# Patient Record
Sex: FEMALE | Race: WHITE | Employment: UNEMPLOYED | URBAN - NONMETROPOLITAN AREA
[De-identification: names, ages, dates, MRNs, and addresses within clinical notes are randomized per-mention and may not be internally consistent; named-entity substitution may affect disease eponyms.]

---

## 2017-01-25 ENCOUNTER — TELEPHONE (OUTPATIENT)
Dept: FAMILY MEDICINE CLINIC | Age: 82
End: 2017-01-25

## 2017-02-01 ENCOUNTER — OFFICE VISIT (OUTPATIENT)
Dept: FAMILY MEDICINE CLINIC | Age: 82
End: 2017-02-01

## 2017-02-01 VITALS
OXYGEN SATURATION: 97 % | BODY MASS INDEX: 44 KG/M2 | HEART RATE: 92 BPM | WEIGHT: 195.6 LBS | HEIGHT: 56 IN | SYSTOLIC BLOOD PRESSURE: 166 MMHG | DIASTOLIC BLOOD PRESSURE: 86 MMHG

## 2017-02-01 DIAGNOSIS — E78.49 OTHER HYPERLIPIDEMIA: ICD-10-CM

## 2017-02-01 DIAGNOSIS — F41.9 ANXIETY: ICD-10-CM

## 2017-02-01 DIAGNOSIS — E66.01 MORBID OBESITY WITH BMI OF 40.0-44.9, ADULT (HCC): ICD-10-CM

## 2017-02-01 DIAGNOSIS — H18.519 FUCHS' CORNEAL DYSTROPHY: ICD-10-CM

## 2017-02-01 DIAGNOSIS — I10 ESSENTIAL HYPERTENSION: Primary | ICD-10-CM

## 2017-02-01 DIAGNOSIS — R41.82 ALTERED MENTAL STATUS, UNSPECIFIED ALTERED MENTAL STATUS TYPE: ICD-10-CM

## 2017-02-01 LAB
BILIRUBIN, POC: NEGATIVE
BLOOD URINE, POC: NEGATIVE
CLARITY, POC: CLEAR
COLOR, POC: YELLOW
GLUCOSE URINE, POC: NEGATIVE
KETONES, POC: NEGATIVE
LEUKOCYTE EST, POC: NEGATIVE
NITRITE, POC: NEGATIVE
PH, POC: 7
PROTEIN, POC: NEGATIVE
SPECIFIC GRAVITY, POC: 1.02
UROBILINOGEN, POC: 0.2

## 2017-02-01 PROCEDURE — 4040F PNEUMOC VAC/ADMIN/RCVD: CPT | Performed by: FAMILY MEDICINE

## 2017-02-01 PROCEDURE — G8427 DOCREV CUR MEDS BY ELIG CLIN: HCPCS | Performed by: FAMILY MEDICINE

## 2017-02-01 PROCEDURE — G8419 CALC BMI OUT NRM PARAM NOF/U: HCPCS | Performed by: FAMILY MEDICINE

## 2017-02-01 PROCEDURE — 1036F TOBACCO NON-USER: CPT | Performed by: FAMILY MEDICINE

## 2017-02-01 PROCEDURE — 81002 URINALYSIS NONAUTO W/O SCOPE: CPT | Performed by: FAMILY MEDICINE

## 2017-02-01 PROCEDURE — 1090F PRES/ABSN URINE INCON ASSESS: CPT | Performed by: FAMILY MEDICINE

## 2017-02-01 PROCEDURE — G8400 PT W/DXA NO RESULTS DOC: HCPCS | Performed by: FAMILY MEDICINE

## 2017-02-01 PROCEDURE — G8484 FLU IMMUNIZE NO ADMIN: HCPCS | Performed by: FAMILY MEDICINE

## 2017-02-01 PROCEDURE — 99214 OFFICE O/P EST MOD 30 MIN: CPT | Performed by: FAMILY MEDICINE

## 2017-02-01 PROCEDURE — 1123F ACP DISCUSS/DSCN MKR DOCD: CPT | Performed by: FAMILY MEDICINE

## 2017-02-01 RX ORDER — FAMCICLOVIR 250 MG/1
250 TABLET, FILM COATED ORAL DAILY
COMMUNITY
End: 2018-08-30 | Stop reason: DRUGHIGH

## 2017-02-01 RX ORDER — ESCITALOPRAM OXALATE 5 MG/1
5 TABLET ORAL DAILY
Qty: 30 TABLET | Refills: 5 | Status: SHIPPED | OUTPATIENT
Start: 2017-02-01 | End: 2017-05-01 | Stop reason: SDUPTHER

## 2017-02-01 ASSESSMENT — ENCOUNTER SYMPTOMS
CONSTIPATION: 0
SHORTNESS OF BREATH: 0
DIARRHEA: 0
BLURRED VISION: 1

## 2017-05-01 DIAGNOSIS — I10 ESSENTIAL HYPERTENSION: ICD-10-CM

## 2017-05-01 DIAGNOSIS — F41.9 ANXIETY: ICD-10-CM

## 2017-05-01 RX ORDER — ATORVASTATIN CALCIUM 10 MG/1
TABLET, FILM COATED ORAL
Qty: 90 TABLET | Refills: 3 | Status: SHIPPED | OUTPATIENT
Start: 2017-05-01 | End: 2018-04-26 | Stop reason: SDUPTHER

## 2017-05-01 RX ORDER — ESCITALOPRAM OXALATE 5 MG/1
5 TABLET ORAL DAILY
Qty: 30 TABLET | Refills: 5 | Status: SHIPPED | OUTPATIENT
Start: 2017-05-01 | End: 2017-11-14 | Stop reason: SDUPTHER

## 2017-05-01 RX ORDER — AMLODIPINE BESYLATE 5 MG/1
5 TABLET ORAL DAILY
Qty: 90 TABLET | Refills: 3 | Status: SHIPPED | OUTPATIENT
Start: 2017-05-01 | End: 2018-04-26 | Stop reason: SDUPTHER

## 2017-05-01 RX ORDER — LOSARTAN POTASSIUM 100 MG/1
100 TABLET ORAL DAILY
Qty: 90 TABLET | Refills: 3 | Status: SHIPPED | OUTPATIENT
Start: 2017-05-01 | End: 2018-04-26 | Stop reason: SDUPTHER

## 2017-05-03 ENCOUNTER — TELEPHONE (OUTPATIENT)
Dept: FAMILY MEDICINE CLINIC | Age: 82
End: 2017-05-03

## 2017-05-04 ENCOUNTER — TELEPHONE (OUTPATIENT)
Dept: FAMILY MEDICINE CLINIC | Age: 82
End: 2017-05-04

## 2017-05-04 DIAGNOSIS — L98.9 FACE LESION: Primary | ICD-10-CM

## 2017-08-14 ENCOUNTER — TELEPHONE (OUTPATIENT)
Dept: FAMILY MEDICINE CLINIC | Age: 82
End: 2017-08-14

## 2017-11-14 DIAGNOSIS — F41.9 ANXIETY: ICD-10-CM

## 2017-11-14 RX ORDER — ESCITALOPRAM OXALATE 5 MG/1
5 TABLET ORAL DAILY
Qty: 30 TABLET | Refills: 5 | Status: SHIPPED | OUTPATIENT
Start: 2017-11-14 | End: 2018-03-30 | Stop reason: SDUPTHER

## 2017-12-12 RX ORDER — FAMCICLOVIR 250 MG/1
250 TABLET, FILM COATED ORAL 2 TIMES DAILY
Qty: 60 TABLET | Refills: 5 | Status: SHIPPED | OUTPATIENT
Start: 2017-12-12 | End: 2017-12-13 | Stop reason: SDUPTHER

## 2017-12-13 RX ORDER — FAMCICLOVIR 250 MG/1
250 TABLET, FILM COATED ORAL 2 TIMES DAILY
Qty: 60 TABLET | Refills: 5 | Status: SHIPPED | OUTPATIENT
Start: 2017-12-13 | End: 2018-01-12

## 2017-12-18 ENCOUNTER — TELEPHONE (OUTPATIENT)
Dept: FAMILY MEDICINE CLINIC | Age: 82
End: 2017-12-18

## 2018-03-30 DIAGNOSIS — F41.9 ANXIETY: ICD-10-CM

## 2018-04-02 RX ORDER — ESCITALOPRAM OXALATE 5 MG/1
TABLET ORAL
Qty: 30 TABLET | Refills: 5 | Status: SHIPPED | OUTPATIENT
Start: 2018-04-02 | End: 2018-10-15 | Stop reason: SDUPTHER

## 2018-05-31 ENCOUNTER — OFFICE VISIT (OUTPATIENT)
Dept: FAMILY MEDICINE CLINIC | Age: 83
End: 2018-05-31

## 2018-05-31 VITALS
WEIGHT: 180.6 LBS | BODY MASS INDEX: 40.63 KG/M2 | SYSTOLIC BLOOD PRESSURE: 138 MMHG | OXYGEN SATURATION: 96 % | DIASTOLIC BLOOD PRESSURE: 72 MMHG | HEIGHT: 56 IN | HEART RATE: 70 BPM

## 2018-05-31 DIAGNOSIS — E78.49 OTHER HYPERLIPIDEMIA: ICD-10-CM

## 2018-05-31 DIAGNOSIS — H18.519 FUCHS' CORNEAL DYSTROPHY: ICD-10-CM

## 2018-05-31 DIAGNOSIS — I10 ESSENTIAL HYPERTENSION: Primary | ICD-10-CM

## 2018-05-31 DIAGNOSIS — E66.01 MORBID OBESITY WITH BMI OF 40.0-44.9, ADULT (HCC): ICD-10-CM

## 2018-05-31 PROCEDURE — G8510 SCR DEP NEG, NO PLAN REQD: HCPCS | Performed by: FAMILY MEDICINE

## 2018-05-31 PROCEDURE — 1090F PRES/ABSN URINE INCON ASSESS: CPT | Performed by: FAMILY MEDICINE

## 2018-05-31 PROCEDURE — G8417 CALC BMI ABV UP PARAM F/U: HCPCS | Performed by: FAMILY MEDICINE

## 2018-05-31 PROCEDURE — G8427 DOCREV CUR MEDS BY ELIG CLIN: HCPCS | Performed by: FAMILY MEDICINE

## 2018-05-31 PROCEDURE — 3288F FALL RISK ASSESSMENT DOCD: CPT | Performed by: FAMILY MEDICINE

## 2018-05-31 PROCEDURE — 1123F ACP DISCUSS/DSCN MKR DOCD: CPT | Performed by: FAMILY MEDICINE

## 2018-05-31 PROCEDURE — G8400 PT W/DXA NO RESULTS DOC: HCPCS | Performed by: FAMILY MEDICINE

## 2018-05-31 PROCEDURE — 4040F PNEUMOC VAC/ADMIN/RCVD: CPT | Performed by: FAMILY MEDICINE

## 2018-05-31 PROCEDURE — 1036F TOBACCO NON-USER: CPT | Performed by: FAMILY MEDICINE

## 2018-05-31 PROCEDURE — 99214 OFFICE O/P EST MOD 30 MIN: CPT | Performed by: FAMILY MEDICINE

## 2018-05-31 ASSESSMENT — PATIENT HEALTH QUESTIONNAIRE - PHQ9
2. FEELING DOWN, DEPRESSED OR HOPELESS: 0
SUM OF ALL RESPONSES TO PHQ9 QUESTIONS 1 & 2: 0
1. LITTLE INTEREST OR PLEASURE IN DOING THINGS: 0
SUM OF ALL RESPONSES TO PHQ QUESTIONS 1-9: 0

## 2018-05-31 ASSESSMENT — ENCOUNTER SYMPTOMS
BLOOD IN STOOL: 0
CONSTIPATION: 0
CHEST TIGHTNESS: 0
DIARRHEA: 0
BLURRED VISION: 1
SHORTNESS OF BREATH: 0

## 2018-08-10 LAB
A/G RATIO: 1 (ref 1–2.5)
ALBUMIN SERPL-MCNC: 3.7 G/DL (ref 3.6–5)
ALP BLD-CCNC: 77 U/L (ref 46–116)
ALT SERPL-CCNC: 24 U/L (ref 12–78)
ANION GAP SERPL CALCULATED.3IONS-SCNC: 16.4 MMOL/L (ref 8–16)
AST SERPL-CCNC: 19 U/L (ref 12–36)
BILIRUB SERPL-MCNC: 0.5 MG/DL (ref 0–1.1)
BUN BLDV-MCNC: 14 MG/DL (ref 5–19)
CALCIUM SERPL-MCNC: 8.9 MG/DL (ref 8.4–10.2)
CHLORIDE BLD-SCNC: 105 MMOL/L (ref 99–111)
CHOLESTEROL, TOTAL: 152 MG/DL (ref 0–200)
CO2: 25 MMOL/L (ref 21–33)
CREAT SERPL-MCNC: 1.1 MG/DL (ref 0.6–1.3)
GFR CALCULATED: 47
GLOBULIN: 3.6 G/DL (ref 2–3.5)
GLUCOSE BLD-MCNC: 112 MG/DL (ref 74–99)
HDLC SERPL-MCNC: 56 MG/DL (ref 18–88)
LDL CHOLESTEROL DIRECT: 63.2 MG/DL
LDL/HDL RATIO: 1.1
POTASSIUM SERPL-SCNC: 4.2 MMOL/L (ref 3.4–5)
SODIUM BLD-SCNC: 142 MMOL/L (ref 136–146)
TOTAL PROTEIN: 7.3 G/DL (ref 6.3–8)
TRIGL SERPL-MCNC: 164 MG/DL (ref 33–163)
VLDLC SERPL CALC-MCNC: 32.8 MG/DL (ref 10–50)

## 2018-08-10 NOTE — LETTER
[address]  [address]  Phone: [phone]  Fax: [phone]    Elias Snider MD        August 10, 2018     Jennie Sees  [address]  [address] [address]      Dear Sanam Rao:    Below are the results from your recent visit:      Labs all okay. If you have any questions or concerns, please don't hesitate to call.     Sincerely,        Elias Snider MD

## 2018-08-30 ENCOUNTER — TELEPHONE (OUTPATIENT)
Dept: FAMILY MEDICINE CLINIC | Age: 83
End: 2018-08-30

## 2018-08-30 RX ORDER — FAMCICLOVIR 250 MG/1
250 TABLET, FILM COATED ORAL DAILY
Qty: 30 TABLET | Refills: 2 | OUTPATIENT
Start: 2018-08-30 | End: 2018-09-29

## 2018-10-02 ENCOUNTER — TELEPHONE (OUTPATIENT)
Dept: FAMILY MEDICINE CLINIC | Age: 83
End: 2018-10-02

## 2018-10-02 PROBLEM — R32 URINARY INCONTINENCE: Status: ACTIVE | Noted: 2018-10-02

## 2018-10-02 PROBLEM — R26.81 UNSTEADY: Status: ACTIVE | Noted: 2018-10-02

## 2018-11-07 ENCOUNTER — OFFICE VISIT (OUTPATIENT)
Dept: FAMILY MEDICINE CLINIC | Age: 83
End: 2018-11-07
Payer: MEDICARE

## 2018-11-07 VITALS
DIASTOLIC BLOOD PRESSURE: 68 MMHG | BODY MASS INDEX: 41.17 KG/M2 | WEIGHT: 183 LBS | SYSTOLIC BLOOD PRESSURE: 130 MMHG | HEART RATE: 77 BPM | OXYGEN SATURATION: 98 % | HEIGHT: 56 IN

## 2018-11-07 DIAGNOSIS — I10 ESSENTIAL HYPERTENSION: ICD-10-CM

## 2018-11-07 DIAGNOSIS — H61.21 IMPACTED CERUMEN OF RIGHT EAR: Primary | ICD-10-CM

## 2018-11-07 PROCEDURE — 1036F TOBACCO NON-USER: CPT | Performed by: FAMILY MEDICINE

## 2018-11-07 PROCEDURE — 1090F PRES/ABSN URINE INCON ASSESS: CPT | Performed by: FAMILY MEDICINE

## 2018-11-07 PROCEDURE — 99213 OFFICE O/P EST LOW 20 MIN: CPT | Performed by: FAMILY MEDICINE

## 2018-11-07 PROCEDURE — G8417 CALC BMI ABV UP PARAM F/U: HCPCS | Performed by: FAMILY MEDICINE

## 2018-11-07 PROCEDURE — G8484 FLU IMMUNIZE NO ADMIN: HCPCS | Performed by: FAMILY MEDICINE

## 2018-11-07 PROCEDURE — 1123F ACP DISCUSS/DSCN MKR DOCD: CPT | Performed by: FAMILY MEDICINE

## 2018-11-07 PROCEDURE — 1101F PT FALLS ASSESS-DOCD LE1/YR: CPT | Performed by: FAMILY MEDICINE

## 2018-11-07 PROCEDURE — 69210 REMOVE IMPACTED EAR WAX UNI: CPT | Performed by: FAMILY MEDICINE

## 2018-11-07 PROCEDURE — G8400 PT W/DXA NO RESULTS DOC: HCPCS | Performed by: FAMILY MEDICINE

## 2018-11-07 PROCEDURE — 4040F PNEUMOC VAC/ADMIN/RCVD: CPT | Performed by: FAMILY MEDICINE

## 2018-11-07 PROCEDURE — G8428 CUR MEDS NOT DOCUMENT: HCPCS | Performed by: FAMILY MEDICINE

## 2018-11-07 ASSESSMENT — ENCOUNTER SYMPTOMS
DIARRHEA: 0
BLOOD IN STOOL: 0
SHORTNESS OF BREATH: 0
CHEST TIGHTNESS: 0
CONSTIPATION: 1

## 2019-01-28 ENCOUNTER — TELEPHONE (OUTPATIENT)
Dept: FAMILY MEDICINE CLINIC | Age: 84
End: 2019-01-28

## 2019-02-04 ENCOUNTER — OFFICE VISIT (OUTPATIENT)
Dept: FAMILY MEDICINE CLINIC | Age: 84
End: 2019-02-04
Payer: MEDICARE

## 2019-02-04 VITALS
HEART RATE: 69 BPM | OXYGEN SATURATION: 97 % | BODY MASS INDEX: 35.4 KG/M2 | SYSTOLIC BLOOD PRESSURE: 138 MMHG | TEMPERATURE: 98.6 F | HEIGHT: 59 IN | WEIGHT: 175.6 LBS | DIASTOLIC BLOOD PRESSURE: 84 MMHG

## 2019-02-04 DIAGNOSIS — Z78.0 ASYMPTOMATIC MENOPAUSAL STATE: ICD-10-CM

## 2019-02-04 DIAGNOSIS — Z00.00 ROUTINE GENERAL MEDICAL EXAMINATION AT A HEALTH CARE FACILITY: ICD-10-CM

## 2019-02-04 PROCEDURE — 4040F PNEUMOC VAC/ADMIN/RCVD: CPT | Performed by: FAMILY MEDICINE

## 2019-02-04 PROCEDURE — G0439 PPPS, SUBSEQ VISIT: HCPCS | Performed by: FAMILY MEDICINE

## 2019-02-04 PROCEDURE — G8482 FLU IMMUNIZE ORDER/ADMIN: HCPCS | Performed by: FAMILY MEDICINE

## 2019-02-04 ASSESSMENT — PATIENT HEALTH QUESTIONNAIRE - PHQ9
SUM OF ALL RESPONSES TO PHQ QUESTIONS 1-9: 0
SUM OF ALL RESPONSES TO PHQ QUESTIONS 1-9: 0

## 2019-02-04 ASSESSMENT — ANXIETY QUESTIONNAIRES: GAD7 TOTAL SCORE: 1

## 2019-02-04 ASSESSMENT — LIFESTYLE VARIABLES: HOW OFTEN DO YOU HAVE A DRINK CONTAINING ALCOHOL: 0

## 2019-08-15 ENCOUNTER — APPOINTMENT (OUTPATIENT)
Dept: GENERAL RADIOLOGY | Age: 84
DRG: 392 | End: 2019-08-15
Payer: MEDICARE

## 2019-08-15 ENCOUNTER — APPOINTMENT (OUTPATIENT)
Dept: CT IMAGING | Age: 84
DRG: 392 | End: 2019-08-15
Payer: MEDICARE

## 2019-08-15 ENCOUNTER — HOSPITAL ENCOUNTER (INPATIENT)
Age: 84
LOS: 3 days | Discharge: NURSING, REHAB OR LONG-TERM CARE | DRG: 392 | End: 2019-08-19
Attending: EMERGENCY MEDICINE | Admitting: INTERNAL MEDICINE
Payer: MEDICARE

## 2019-08-15 DIAGNOSIS — R13.10 DYSPHAGIA, UNSPECIFIED TYPE: Primary | ICD-10-CM

## 2019-08-15 DIAGNOSIS — R41.82 ALTERED MENTAL STATUS, UNSPECIFIED ALTERED MENTAL STATUS TYPE: ICD-10-CM

## 2019-08-15 DIAGNOSIS — R19.7 DIARRHEA, UNSPECIFIED TYPE: ICD-10-CM

## 2019-08-15 LAB
A/G RATIO: 1 (ref 1.1–2.2)
ALBUMIN SERPL-MCNC: 3.5 G/DL (ref 3.4–5)
ALP BLD-CCNC: 100 U/L (ref 40–129)
ALT SERPL-CCNC: 10 U/L (ref 10–40)
ANION GAP SERPL CALCULATED.3IONS-SCNC: 18 MMOL/L (ref 3–16)
AST SERPL-CCNC: 12 U/L (ref 15–37)
BACTERIA: ABNORMAL /HPF
BASOPHILS ABSOLUTE: 0.2 K/UL (ref 0–0.2)
BASOPHILS RELATIVE PERCENT: 2.1 %
BILIRUB SERPL-MCNC: 0.4 MG/DL (ref 0–1)
BILIRUBIN URINE: NEGATIVE
BLOOD, URINE: NEGATIVE
BUN BLDV-MCNC: 8 MG/DL (ref 7–20)
CALCIUM SERPL-MCNC: 9.1 MG/DL (ref 8.3–10.6)
CHLORIDE BLD-SCNC: 95 MMOL/L (ref 99–110)
CLARITY: CLEAR
CO2: 20 MMOL/L (ref 21–32)
COLOR: ABNORMAL
CREAT SERPL-MCNC: 0.9 MG/DL (ref 0.6–1.2)
EOSINOPHILS ABSOLUTE: 0 K/UL (ref 0–0.6)
EOSINOPHILS RELATIVE PERCENT: 0.5 %
EPITHELIAL CELLS, UA: ABNORMAL /HPF
GFR AFRICAN AMERICAN: >60
GFR NON-AFRICAN AMERICAN: 59
GLOBULIN: 3.6 G/DL
GLUCOSE BLD-MCNC: 105 MG/DL (ref 70–99)
GLUCOSE URINE: NEGATIVE MG/DL
HCT VFR BLD CALC: 36.8 % (ref 36–48)
HEMOGLOBIN: 12.2 G/DL (ref 12–16)
KETONES, URINE: NEGATIVE MG/DL
LEUKOCYTE ESTERASE, URINE: ABNORMAL
LYMPHOCYTES ABSOLUTE: 1.7 K/UL (ref 1–5.1)
LYMPHOCYTES RELATIVE PERCENT: 21.8 %
MCH RBC QN AUTO: 29.8 PG (ref 26–34)
MCHC RBC AUTO-ENTMCNC: 33.1 G/DL (ref 31–36)
MCV RBC AUTO: 90.1 FL (ref 80–100)
MICROSCOPIC EXAMINATION: YES
MONOCYTES ABSOLUTE: 0.5 K/UL (ref 0–1.3)
MONOCYTES RELATIVE PERCENT: 6.7 %
NEUTROPHILS ABSOLUTE: 5.2 K/UL (ref 1.7–7.7)
NEUTROPHILS RELATIVE PERCENT: 68.9 %
NITRITE, URINE: NEGATIVE
PDW BLD-RTO: 14.5 % (ref 12.4–15.4)
PH UA: 7 (ref 5–8)
PLATELET # BLD: 349 K/UL (ref 135–450)
PMV BLD AUTO: 6.5 FL (ref 5–10.5)
POTASSIUM REFLEX MAGNESIUM: 3.6 MMOL/L (ref 3.5–5.1)
PROTEIN UA: NEGATIVE MG/DL
RBC # BLD: 4.08 M/UL (ref 4–5.2)
RBC UA: ABNORMAL /HPF (ref 0–2)
SODIUM BLD-SCNC: 133 MMOL/L (ref 136–145)
SPECIFIC GRAVITY UA: <=1.005 (ref 1–1.03)
TOTAL PROTEIN: 7.1 G/DL (ref 6.4–8.2)
URINE REFLEX TO CULTURE: YES
URINE TYPE: ABNORMAL
UROBILINOGEN, URINE: 0.2 E.U./DL
WBC # BLD: 7.6 K/UL (ref 4–11)
WBC UA: ABNORMAL /HPF (ref 0–5)

## 2019-08-15 PROCEDURE — 85025 COMPLETE CBC W/AUTO DIFF WBC: CPT

## 2019-08-15 PROCEDURE — 74176 CT ABD & PELVIS W/O CONTRAST: CPT

## 2019-08-15 PROCEDURE — 96374 THER/PROPH/DIAG INJ IV PUSH: CPT

## 2019-08-15 PROCEDURE — 81001 URINALYSIS AUTO W/SCOPE: CPT

## 2019-08-15 PROCEDURE — 80053 COMPREHEN METABOLIC PANEL: CPT

## 2019-08-15 PROCEDURE — 99285 EMERGENCY DEPT VISIT HI MDM: CPT

## 2019-08-15 PROCEDURE — 6360000002 HC RX W HCPCS: Performed by: EMERGENCY MEDICINE

## 2019-08-15 PROCEDURE — 36415 COLL VENOUS BLD VENIPUNCTURE: CPT

## 2019-08-15 PROCEDURE — 96375 TX/PRO/DX INJ NEW DRUG ADDON: CPT

## 2019-08-15 PROCEDURE — 87086 URINE CULTURE/COLONY COUNT: CPT

## 2019-08-15 PROCEDURE — 71045 X-RAY EXAM CHEST 1 VIEW: CPT

## 2019-08-15 PROCEDURE — G0378 HOSPITAL OBSERVATION PER HR: HCPCS

## 2019-08-15 PROCEDURE — C9113 INJ PANTOPRAZOLE SODIUM, VIA: HCPCS | Performed by: EMERGENCY MEDICINE

## 2019-08-15 PROCEDURE — 2580000003 HC RX 258: Performed by: INTERNAL MEDICINE

## 2019-08-15 PROCEDURE — 2500000003 HC RX 250 WO HCPCS: Performed by: INTERNAL MEDICINE

## 2019-08-15 RX ORDER — LOTEPREDNOL ETABONATE 5 MG/ML
1 SUSPENSION/ DROPS OPHTHALMIC DAILY
COMMUNITY

## 2019-08-15 RX ORDER — BACITRACIN 500 [USP'U]/G
OINTMENT OPHTHALMIC 2 TIMES DAILY
COMMUNITY

## 2019-08-15 RX ORDER — FAMCICLOVIR 250 MG/1
250 TABLET, FILM COATED ORAL 2 TIMES DAILY
COMMUNITY

## 2019-08-15 RX ORDER — DIFLUPREDNATE 0.5 MG/ML
1 EMULSION OPHTHALMIC DAILY
COMMUNITY

## 2019-08-15 RX ORDER — POTASSIUM CHLORIDE 20 MEQ/1
40 TABLET, EXTENDED RELEASE ORAL PRN
Status: DISCONTINUED | OUTPATIENT
Start: 2019-08-15 | End: 2019-08-19 | Stop reason: HOSPADM

## 2019-08-15 RX ORDER — ACETAMINOPHEN 325 MG/1
650 TABLET ORAL EVERY 4 HOURS PRN
Status: DISCONTINUED | OUTPATIENT
Start: 2019-08-15 | End: 2019-08-19 | Stop reason: HOSPADM

## 2019-08-15 RX ORDER — SODIUM CHLORIDE 0.9 % (FLUSH) 0.9 %
10 SYRINGE (ML) INJECTION EVERY 12 HOURS SCHEDULED
Status: DISCONTINUED | OUTPATIENT
Start: 2019-08-15 | End: 2019-08-19 | Stop reason: HOSPADM

## 2019-08-15 RX ORDER — ONDANSETRON 2 MG/ML
4 INJECTION INTRAMUSCULAR; INTRAVENOUS EVERY 6 HOURS PRN
Status: DISCONTINUED | OUTPATIENT
Start: 2019-08-15 | End: 2019-08-19 | Stop reason: HOSPADM

## 2019-08-15 RX ORDER — PANTOPRAZOLE SODIUM 40 MG/10ML
40 INJECTION, POWDER, LYOPHILIZED, FOR SOLUTION INTRAVENOUS ONCE
Status: COMPLETED | OUTPATIENT
Start: 2019-08-15 | End: 2019-08-15

## 2019-08-15 RX ORDER — SODIUM CHLORIDE 0.9 % (FLUSH) 0.9 %
10 SYRINGE (ML) INJECTION PRN
Status: DISCONTINUED | OUTPATIENT
Start: 2019-08-15 | End: 2019-08-19 | Stop reason: HOSPADM

## 2019-08-15 RX ORDER — POTASSIUM CHLORIDE 7.45 MG/ML
10 INJECTION INTRAVENOUS PRN
Status: DISCONTINUED | OUTPATIENT
Start: 2019-08-15 | End: 2019-08-19 | Stop reason: HOSPADM

## 2019-08-15 RX ORDER — DEXTROSE, SODIUM CHLORIDE, AND POTASSIUM CHLORIDE 5; .45; .15 G/100ML; G/100ML; G/100ML
INJECTION INTRAVENOUS CONTINUOUS
Status: DISCONTINUED | OUTPATIENT
Start: 2019-08-15 | End: 2019-08-17

## 2019-08-15 RX ORDER — ACETAMINOPHEN 500 MG
500 TABLET ORAL 2 TIMES DAILY
COMMUNITY

## 2019-08-15 RX ADMIN — PANTOPRAZOLE SODIUM 40 MG: 40 INJECTION, POWDER, LYOPHILIZED, FOR SOLUTION INTRAVENOUS at 21:24

## 2019-08-15 RX ADMIN — Medication 10 ML: at 23:51

## 2019-08-15 RX ADMIN — POTASSIUM CHLORIDE, DEXTROSE MONOHYDRATE AND SODIUM CHLORIDE: 150; 5; 450 INJECTION, SOLUTION INTRAVENOUS at 23:51

## 2019-08-15 ASSESSMENT — PAIN DESCRIPTION - PAIN TYPE: TYPE: ACUTE PAIN

## 2019-08-15 ASSESSMENT — PAIN SCALES - WONG BAKER: WONGBAKER_NUMERICALRESPONSE: 2

## 2019-08-15 ASSESSMENT — PAIN DESCRIPTION - LOCATION: LOCATION: ABDOMEN

## 2019-08-16 LAB
A/G RATIO: 1 (ref 1.1–2.2)
ALBUMIN SERPL-MCNC: 3.2 G/DL (ref 3.4–5)
ALP BLD-CCNC: 92 U/L (ref 40–129)
ALT SERPL-CCNC: 8 U/L (ref 10–40)
ANION GAP SERPL CALCULATED.3IONS-SCNC: 8 MMOL/L (ref 3–16)
AST SERPL-CCNC: 10 U/L (ref 15–37)
BASOPHILS ABSOLUTE: 0 K/UL (ref 0–0.2)
BASOPHILS RELATIVE PERCENT: 0.6 %
BILIRUB SERPL-MCNC: 0.3 MG/DL (ref 0–1)
BUN BLDV-MCNC: 6 MG/DL (ref 7–20)
C DIFF TOXIN/ANTIGEN: NORMAL
CALCIUM SERPL-MCNC: 8.7 MG/DL (ref 8.3–10.6)
CHLORIDE BLD-SCNC: 97 MMOL/L (ref 99–110)
CO2: 23 MMOL/L (ref 21–32)
CREAT SERPL-MCNC: 0.7 MG/DL (ref 0.6–1.2)
EOSINOPHILS ABSOLUTE: 0 K/UL (ref 0–0.6)
EOSINOPHILS RELATIVE PERCENT: 0.5 %
GFR AFRICAN AMERICAN: >60
GFR NON-AFRICAN AMERICAN: >60
GLOBULIN: 3.3 G/DL
GLUCOSE BLD-MCNC: 104 MG/DL (ref 70–99)
GLUCOSE BLD-MCNC: 108 MG/DL (ref 70–99)
GLUCOSE BLD-MCNC: 118 MG/DL (ref 70–99)
HCT VFR BLD CALC: 35.4 % (ref 36–48)
HEMOGLOBIN: 11.8 G/DL (ref 12–16)
LYMPHOCYTES ABSOLUTE: 1.6 K/UL (ref 1–5.1)
LYMPHOCYTES RELATIVE PERCENT: 31.3 %
MCH RBC QN AUTO: 29.9 PG (ref 26–34)
MCHC RBC AUTO-ENTMCNC: 33.4 G/DL (ref 31–36)
MCV RBC AUTO: 89.7 FL (ref 80–100)
MONOCYTES ABSOLUTE: 0.5 K/UL (ref 0–1.3)
MONOCYTES RELATIVE PERCENT: 10 %
NEUTROPHILS ABSOLUTE: 3 K/UL (ref 1.7–7.7)
NEUTROPHILS RELATIVE PERCENT: 57.6 %
PDW BLD-RTO: 14.5 % (ref 12.4–15.4)
PERFORMED ON: ABNORMAL
PERFORMED ON: ABNORMAL
PLATELET # BLD: 317 K/UL (ref 135–450)
PMV BLD AUTO: 6.8 FL (ref 5–10.5)
POTASSIUM REFLEX MAGNESIUM: 3.7 MMOL/L (ref 3.5–5.1)
RBC # BLD: 3.95 M/UL (ref 4–5.2)
SODIUM BLD-SCNC: 128 MMOL/L (ref 136–145)
TOTAL PROTEIN: 6.5 G/DL (ref 6.4–8.2)
URINE CULTURE, ROUTINE: NORMAL
WBC # BLD: 5.2 K/UL (ref 4–11)

## 2019-08-16 PROCEDURE — 7100000011 HC PHASE II RECOVERY - ADDTL 15 MIN: Performed by: INTERNAL MEDICINE

## 2019-08-16 PROCEDURE — 6370000000 HC RX 637 (ALT 250 FOR IP): Performed by: INTERNAL MEDICINE

## 2019-08-16 PROCEDURE — 87449 NOS EACH ORGANISM AG IA: CPT

## 2019-08-16 PROCEDURE — 0DB38ZX EXCISION OF LOWER ESOPHAGUS, VIA NATURAL OR ARTIFICIAL OPENING ENDOSCOPIC, DIAGNOSTIC: ICD-10-PCS | Performed by: INTERNAL MEDICINE

## 2019-08-16 PROCEDURE — 80053 COMPREHEN METABOLIC PANEL: CPT

## 2019-08-16 PROCEDURE — 6360000002 HC RX W HCPCS: Performed by: INTERNAL MEDICINE

## 2019-08-16 PROCEDURE — 36415 COLL VENOUS BLD VENIPUNCTURE: CPT

## 2019-08-16 PROCEDURE — 2500000003 HC RX 250 WO HCPCS: Performed by: INTERNAL MEDICINE

## 2019-08-16 PROCEDURE — 87046 STOOL CULTR AEROBIC BACT EA: CPT

## 2019-08-16 PROCEDURE — 92526 ORAL FUNCTION THERAPY: CPT

## 2019-08-16 PROCEDURE — 2709999900 HC NON-CHARGEABLE SUPPLY: Performed by: INTERNAL MEDICINE

## 2019-08-16 PROCEDURE — 85025 COMPLETE CBC W/AUTO DIFF WBC: CPT

## 2019-08-16 PROCEDURE — 87324 CLOSTRIDIUM AG IA: CPT

## 2019-08-16 PROCEDURE — 88305 TISSUE EXAM BY PATHOLOGIST: CPT

## 2019-08-16 PROCEDURE — 87505 NFCT AGENT DETECTION GI: CPT

## 2019-08-16 PROCEDURE — 92610 EVALUATE SWALLOWING FUNCTION: CPT

## 2019-08-16 PROCEDURE — 7100000010 HC PHASE II RECOVERY - FIRST 15 MIN: Performed by: INTERNAL MEDICINE

## 2019-08-16 PROCEDURE — 99152 MOD SED SAME PHYS/QHP 5/>YRS: CPT | Performed by: INTERNAL MEDICINE

## 2019-08-16 PROCEDURE — 96372 THER/PROPH/DIAG INJ SC/IM: CPT

## 2019-08-16 PROCEDURE — 99232 SBSQ HOSP IP/OBS MODERATE 35: CPT | Performed by: INTERNAL MEDICINE

## 2019-08-16 PROCEDURE — 1200000000 HC SEMI PRIVATE

## 2019-08-16 PROCEDURE — 3609012400 HC EGD TRANSORAL BIOPSY SINGLE/MULTIPLE: Performed by: INTERNAL MEDICINE

## 2019-08-16 RX ORDER — LOTEPREDNOL ETABONATE 5 MG/ML
1 SUSPENSION/ DROPS OPHTHALMIC DAILY
Status: DISCONTINUED | OUTPATIENT
Start: 2019-08-16 | End: 2019-08-19 | Stop reason: HOSPADM

## 2019-08-16 RX ORDER — MIDAZOLAM HYDROCHLORIDE 5 MG/ML
INJECTION INTRAMUSCULAR; INTRAVENOUS PRN
Status: DISCONTINUED | OUTPATIENT
Start: 2019-08-16 | End: 2019-08-16 | Stop reason: ALTCHOICE

## 2019-08-16 RX ORDER — CARBOXYMETHYLCELLULOSE SODIUM 10 MG/ML
1 GEL OPHTHALMIC 3 TIMES DAILY
Status: DISCONTINUED | OUTPATIENT
Start: 2019-08-16 | End: 2019-08-19 | Stop reason: HOSPADM

## 2019-08-16 RX ORDER — HYDRALAZINE HYDROCHLORIDE 20 MG/ML
10 INJECTION INTRAMUSCULAR; INTRAVENOUS EVERY 6 HOURS PRN
Status: DISCONTINUED | OUTPATIENT
Start: 2019-08-16 | End: 2019-08-19 | Stop reason: HOSPADM

## 2019-08-16 RX ORDER — DIFLUPREDNATE 0.5 MG/ML
1 EMULSION OPHTHALMIC DAILY
Status: DISCONTINUED | OUTPATIENT
Start: 2019-08-16 | End: 2019-08-16 | Stop reason: RX

## 2019-08-16 RX ORDER — SILICONE ADHESIVE 1.5" X 3"
1 SHEET (EA) TOPICAL 2 TIMES DAILY
Status: DISCONTINUED | OUTPATIENT
Start: 2019-08-16 | End: 2019-08-19 | Stop reason: HOSPADM

## 2019-08-16 RX ORDER — BACITRACIN 500 [USP'U]/G
OINTMENT OPHTHALMIC 2 TIMES DAILY
Status: DISCONTINUED | OUTPATIENT
Start: 2019-08-16 | End: 2019-08-16 | Stop reason: RX

## 2019-08-16 RX ORDER — FENTANYL CITRATE 50 UG/ML
INJECTION, SOLUTION INTRAMUSCULAR; INTRAVENOUS PRN
Status: DISCONTINUED | OUTPATIENT
Start: 2019-08-16 | End: 2019-08-16 | Stop reason: ALTCHOICE

## 2019-08-16 RX ORDER — SUCRALFATE ORAL 1 G/10ML
1 SUSPENSION ORAL EVERY 6 HOURS SCHEDULED
Status: DISCONTINUED | OUTPATIENT
Start: 2019-08-16 | End: 2019-08-19 | Stop reason: HOSPADM

## 2019-08-16 RX ORDER — TIMOLOL MALEATE 5 MG/ML
1 SOLUTION/ DROPS OPHTHALMIC 2 TIMES DAILY
Status: DISCONTINUED | OUTPATIENT
Start: 2019-08-16 | End: 2019-08-19 | Stop reason: HOSPADM

## 2019-08-16 RX ORDER — BACITRACIN ZINC AND POLYMYXIN B SULFATE 500; 10000 [USP'U]/G; [USP'U]/G
OINTMENT OPHTHALMIC 2 TIMES DAILY
Status: DISCONTINUED | OUTPATIENT
Start: 2019-08-16 | End: 2019-08-19 | Stop reason: HOSPADM

## 2019-08-16 RX ORDER — SILICONE ADHESIVE 1.5" X 3"
1 SHEET (EA) TOPICAL 2 TIMES DAILY
Status: DISCONTINUED | OUTPATIENT
Start: 2019-08-16 | End: 2019-08-16 | Stop reason: RX

## 2019-08-16 RX ADMIN — POTASSIUM CHLORIDE, DEXTROSE MONOHYDRATE AND SODIUM CHLORIDE: 150; 5; 450 INJECTION, SOLUTION INTRAVENOUS at 08:58

## 2019-08-16 RX ADMIN — TIMOLOL MALEATE 1 DROP: 5 SOLUTION OPHTHALMIC at 15:34

## 2019-08-16 RX ADMIN — SUCRALFATE 1 G: 1 SUSPENSION ORAL at 23:13

## 2019-08-16 RX ADMIN — CARBOXYMETHYLCELLULOSE SODIUM 1 DROP: 10 GEL OPHTHALMIC at 15:44

## 2019-08-16 RX ADMIN — Medication 1 DROP: at 20:56

## 2019-08-16 RX ADMIN — BACITRACIN ZINC AND POLYMYXIN B SULFATE: 500; 10000 OINTMENT OPHTHALMIC at 20:56

## 2019-08-16 RX ADMIN — CARBOXYMETHYLCELLULOSE SODIUM 1 DROP: 10 GEL OPHTHALMIC at 20:56

## 2019-08-16 RX ADMIN — ENOXAPARIN SODIUM 40 MG: 40 INJECTION SUBCUTANEOUS at 09:06

## 2019-08-16 RX ADMIN — POTASSIUM CHLORIDE, DEXTROSE MONOHYDRATE AND SODIUM CHLORIDE: 150; 5; 450 INJECTION, SOLUTION INTRAVENOUS at 18:25

## 2019-08-16 RX ADMIN — TIMOLOL MALEATE 1 DROP: 5 SOLUTION OPHTHALMIC at 20:56

## 2019-08-16 RX ADMIN — LOTEPREDNOL ETABONATE 1 DROP: 5 SUSPENSION/ DROPS OPHTHALMIC at 15:39

## 2019-08-16 RX ADMIN — SUCRALFATE 1 G: 1 SUSPENSION ORAL at 18:24

## 2019-08-16 RX ADMIN — BACITRACIN ZINC AND POLYMYXIN B SULFATE: 500; 10000 OINTMENT OPHTHALMIC at 15:49

## 2019-08-16 NOTE — H&P
diarrhea  Genitourinary:   Endorses dysphagia to pills, chronic diarrhea  Hematologic/Lymphatic:  Negative for  bleeding tendency, easy bruising  Musculoskeletal:  Negative for myalgias and arthralgias  Neurologic:  Negative for  confusion,dysarthria. Skin:  Negative for itching,rash  Psychiatric:  Negative for depression,anxiety, agitation. Endocrine:  Negative for polydipsia,polyuria,heat /cold intolerance. PHYSICAL EXAM:  BP (!) 155/73   Pulse 87   Temp 97.9 °F (36.6 °C) (Oral)   Resp 16   Ht 4' 10\" (1.473 m)   Wt 180 lb 3.2 oz (81.7 kg)   SpO2 96%   BMI 37.66 kg/m²   General appearance:  No apparent distress, appears stated age and cooperative. HEENT:  Normal cephalic, atraumatic without obvious deformity. Pupils equal, round, and reactive to light. Extra ocular muscles intact. Conjunctivae/corneas clear. Neck: Supple, with full range of motion. No jugular venous distention. Trachea midline. Respiratory:  Normal respiratory effort. Clear to auscultation, bilaterally without Rales/Wheezes/Rhonchi. Cardiovascular:  Regular rate and rhythm with normal S1/S2 without murmurs, rubs or gallops. Abdomen: Soft, non-tender, non-distended with normal bowel sounds. Musculoskeletal:  No clubbing, cyanosis or edema bilaterally. Full range of motion without deformity. Skin: Skin color, texture, turgor normal.  No rashes or lesions. Neurologic:  Neurovascularly intact without any focal sensory/motor deficits.  Cranial nerves: II-XII intact, grossly non-focal.  Psychiatric:  Alert and oriented, thought content appropriate, normal insight  Capillary Refill: Brisk,< 3 seconds   Peripheral Pulses: +2 palpable, equal bilaterally       Labs:   Recent Labs     08/15/19  1825   WBC 7.6   HGB 12.2   HCT 36.8        Recent Labs     08/15/19  1825   *   K 3.6   CL 95*   CO2 20*   BUN 8   CREATININE 0.9   CALCIUM 9.1     Recent Labs     08/15/19  1825   AST 12*   ALT 10   BILITOT 0.4   ALKPHOS 100     No

## 2019-08-16 NOTE — H&P
Gastroenterology Preop Assessment    Patient:   Afsaneh Maciel   :    1934   Facility:   [facility]  Referring/PCP: HUGO Landa - SONNY  Date:     2019    Subjective:   Procedure: EGD with dilation    HPI/Reason for procedure:  81 yo female with explosive diarrhea and dysphagia. Has had previous schatzki ring with dilation several years ago. Family says diarrhea is chronic but appears more explosive recently. Past Medical History:   Diagnosis Date    Arthritis     Diverticulitis     Esophagitis     Glaucoma     Hyperlipidemia     Hypertension     Legally blind     left eye    Urinary tract infection      Past Surgical History:   Procedure Laterality Date    CHOLECYSTECTOMY  13    CYSTOSCOPY  13    with left ureteroscopy and stent placement    ENDOSCOPY, COLON, DIAGNOSTIC  8/24/15    Hietal hernia,Esophagitis, gastric ulcer    EYE SURGERY      UPPER GASTROINTESTINAL ENDOSCOPY  2015    esophagitis       Social:   Social History     Tobacco Use    Smoking status: Never Smoker    Smokeless tobacco: Never Used   Substance Use Topics    Alcohol use: No     Family: History reviewed. No pertinent family history. Scheduled Medications:    loteprednol  1 drop Both Eyes Daily    carboxymethylcellulose PF  1 drop Both Eyes TID    timolol  1 drop Left Eye BID    bacitracin-polymyxin b   Both Eyes BID    sodium chloride flush  10 mL Intravenous 2 times per day    enoxaparin  40 mg Subcutaneous Daily       Current Medications:    Prior to Admission medications    Medication Sig Start Date End Date Taking?  Authorizing Provider   CRANBERRY CONCENTRATE PO Take 1 capsule by mouth daily   Yes Historical Provider, MD   difluprednate (DUREZOL) 0.05 % EMUL Place 1 drop into the left eye daily   Yes Historical Provider, MD   loteprednol (LOTEMAX) 0.5 % ophthalmic suspension Place 1 drop into the right eye daily   Yes Historical Provider, MD   acetaminophen MD Beny, 10 mL at 08/15/19 2351    sodium chloride flush 0.9 % injection 10 mL, 10 mL, Intravenous, PRN, Gosia Allen MD    magnesium hydroxide (MILK OF MAGNESIA) 400 MG/5ML suspension 30 mL, 30 mL, Oral, Daily PRN, Gosia Allen MD    ondansetron (ZOFRAN) injection 4 mg, 4 mg, Intravenous, Q6H PRN, Gosia Allen MD    enoxaparin (LOVENOX) injection 40 mg, 40 mg, Subcutaneous, Daily, Gosia Allen MD, 40 mg at 08/16/19 0906    potassium chloride (KLOR-CON M) extended release tablet 40 mEq, 40 mEq, Oral, PRN **OR** potassium bicarb-citric acid (EFFER-K) effervescent tablet 40 mEq, 40 mEq, Oral, PRN **OR** potassium chloride 10 mEq/100 mL IVPB (Peripheral Line), 10 mEq, Intravenous, PRN, Gosia Allen MD    acetaminophen (TYLENOL) tablet 650 mg, 650 mg, Oral, Q4H PRN, Gosia Allen MD    dextrose 5 % and 0.45 % NaCl with KCl 20 mEq infusion, , Intravenous, Continuous, Gosia Allen MD, Last Rate: 100 mL/hr at 08/16/19 0858      Infusions:    dextrose 5% and 0.45% NaCl with KCl 20 mEq 100 mL/hr at 08/16/19 0858     PRN Medications: hydrALAZINE, sodium chloride flush, magnesium hydroxide, ondansetron, potassium chloride **OR** potassium alternative oral replacement **OR** potassium chloride, acetaminophen  Allergies:    Allergies   Allergen Reactions    Aspirin          Objective:     Physical Exam:   BP (!) 187/71   Pulse 80   Temp 98.2 °F (36.8 °C) (Temporal)   Resp 16   Ht 4' 10\" (1.473 m)   Wt 180 lb 3.2 oz (81.7 kg)   SpO2 97%   BMI 37.66 kg/m²     HEENT: NCAT  Lungs: CTAB  CV: RRR  Abd: soft, ntd  Ext: dpi    Lab and Imaging Review   Labs:  CBC:   Recent Labs     08/15/19  1825 08/16/19  0539   WBC 7.6 5.2   HGB 12.2 11.8*   HCT 36.8 35.4*   MCV 90.1 89.7    317     BMP:   Recent Labs     08/15/19  1825 08/16/19  0539   * 128*   K 3.6 3.7   CL 95* 97*   CO2 20* 23   BUN 8 6*   CREATININE 0.9 0.7     LIVER PROFILE:   Recent Labs     08/15/19  1825 08/16/19  0539

## 2019-08-17 LAB — GI BACTERIAL PATHOGENS BY PCR: NORMAL

## 2019-08-17 PROCEDURE — 2500000003 HC RX 250 WO HCPCS: Performed by: INTERNAL MEDICINE

## 2019-08-17 PROCEDURE — 6370000000 HC RX 637 (ALT 250 FOR IP): Performed by: INTERNAL MEDICINE

## 2019-08-17 PROCEDURE — 92526 ORAL FUNCTION THERAPY: CPT

## 2019-08-17 PROCEDURE — 1200000000 HC SEMI PRIVATE

## 2019-08-17 PROCEDURE — 6360000002 HC RX W HCPCS: Performed by: INTERNAL MEDICINE

## 2019-08-17 PROCEDURE — 2580000003 HC RX 258: Performed by: INTERNAL MEDICINE

## 2019-08-17 RX ORDER — ATORVASTATIN CALCIUM 10 MG/1
10 TABLET, FILM COATED ORAL DAILY
Status: DISCONTINUED | OUTPATIENT
Start: 2019-08-17 | End: 2019-08-19 | Stop reason: HOSPADM

## 2019-08-17 RX ORDER — LOSARTAN POTASSIUM 100 MG/1
100 TABLET ORAL DAILY
Status: DISCONTINUED | OUTPATIENT
Start: 2019-08-17 | End: 2019-08-19 | Stop reason: HOSPADM

## 2019-08-17 RX ORDER — AMLODIPINE BESYLATE 5 MG/1
5 TABLET ORAL DAILY
Status: DISCONTINUED | OUTPATIENT
Start: 2019-08-17 | End: 2019-08-19 | Stop reason: HOSPADM

## 2019-08-17 RX ORDER — PANTOPRAZOLE SODIUM 40 MG/1
40 TABLET, DELAYED RELEASE ORAL
Status: DISCONTINUED | OUTPATIENT
Start: 2019-08-17 | End: 2019-08-17

## 2019-08-17 RX ORDER — ACETAMINOPHEN 325 MG/1
650 TABLET ORAL EVERY 6 HOURS PRN
Status: DISCONTINUED | OUTPATIENT
Start: 2019-08-17 | End: 2019-08-19 | Stop reason: HOSPADM

## 2019-08-17 RX ORDER — PANTOPRAZOLE SODIUM 40 MG/1
40 GRANULE, DELAYED RELEASE ORAL
Status: DISCONTINUED | OUTPATIENT
Start: 2019-08-17 | End: 2019-08-19 | Stop reason: HOSPADM

## 2019-08-17 RX ORDER — ESCITALOPRAM OXALATE 10 MG/1
5 TABLET ORAL DAILY
Status: DISCONTINUED | OUTPATIENT
Start: 2019-08-17 | End: 2019-08-19 | Stop reason: HOSPADM

## 2019-08-17 RX ORDER — ACYCLOVIR 200 MG/1
400 CAPSULE ORAL EVERY 8 HOURS
Status: DISCONTINUED | OUTPATIENT
Start: 2019-08-17 | End: 2019-08-19 | Stop reason: HOSPADM

## 2019-08-17 RX ORDER — CHOLECALCIFEROL (VITAMIN D3) 125 MCG
500 CAPSULE ORAL DAILY
Status: DISCONTINUED | OUTPATIENT
Start: 2019-08-17 | End: 2019-08-19 | Stop reason: HOSPADM

## 2019-08-17 RX ADMIN — CYANOCOBALAMIN TAB 500 MCG 500 MCG: 500 TAB at 13:39

## 2019-08-17 RX ADMIN — CARBOXYMETHYLCELLULOSE SODIUM 1 DROP: 10 GEL OPHTHALMIC at 21:49

## 2019-08-17 RX ADMIN — Medication 1 DROP: at 21:44

## 2019-08-17 RX ADMIN — SUCRALFATE 1 G: 1 SUSPENSION ORAL at 17:58

## 2019-08-17 RX ADMIN — POTASSIUM CHLORIDE, DEXTROSE MONOHYDRATE AND SODIUM CHLORIDE: 150; 5; 450 INJECTION, SOLUTION INTRAVENOUS at 03:03

## 2019-08-17 RX ADMIN — TIMOLOL MALEATE 1 DROP: 5 SOLUTION OPHTHALMIC at 08:29

## 2019-08-17 RX ADMIN — VITAMIN D, TAB 1000IU (100/BT) 2000 UNITS: 25 TAB at 13:39

## 2019-08-17 RX ADMIN — BACITRACIN ZINC AND POLYMYXIN B SULFATE: 500; 10000 OINTMENT OPHTHALMIC at 21:45

## 2019-08-17 RX ADMIN — ACYCLOVIR 400 MG: 200 CAPSULE ORAL at 21:40

## 2019-08-17 RX ADMIN — BACITRACIN ZINC AND POLYMYXIN B SULFATE: 500; 10000 OINTMENT OPHTHALMIC at 08:28

## 2019-08-17 RX ADMIN — ESCITALOPRAM OXALATE 5 MG: 10 TABLET ORAL at 13:55

## 2019-08-17 RX ADMIN — ACYCLOVIR 400 MG: 200 CAPSULE ORAL at 13:39

## 2019-08-17 RX ADMIN — AMLODIPINE BESYLATE 5 MG: 5 TABLET ORAL at 13:38

## 2019-08-17 RX ADMIN — CARBOXYMETHYLCELLULOSE SODIUM 1 DROP: 10 GEL OPHTHALMIC at 08:28

## 2019-08-17 RX ADMIN — PANTOPRAZOLE SODIUM 40 MG: 40 GRANULE, DELAYED RELEASE ORAL at 16:50

## 2019-08-17 RX ADMIN — SUCRALFATE 1 G: 1 SUSPENSION ORAL at 12:06

## 2019-08-17 RX ADMIN — CARBOXYMETHYLCELLULOSE SODIUM 1 DROP: 10 GEL OPHTHALMIC at 13:54

## 2019-08-17 RX ADMIN — Medication 1 DROP: at 08:30

## 2019-08-17 RX ADMIN — LOSARTAN POTASSIUM 100 MG: 100 TABLET, FILM COATED ORAL at 13:38

## 2019-08-17 RX ADMIN — ATORVASTATIN CALCIUM 10 MG: 10 TABLET, FILM COATED ORAL at 21:40

## 2019-08-17 RX ADMIN — SUCRALFATE 1 G: 1 SUSPENSION ORAL at 05:05

## 2019-08-17 RX ADMIN — TIMOLOL MALEATE 1 DROP: 5 SOLUTION OPHTHALMIC at 21:44

## 2019-08-17 RX ADMIN — LOTEPREDNOL ETABONATE 1 DROP: 5 SUSPENSION/ DROPS OPHTHALMIC at 08:29

## 2019-08-17 RX ADMIN — Medication 10 ML: at 21:45

## 2019-08-17 RX ADMIN — ENOXAPARIN SODIUM 40 MG: 40 INJECTION SUBCUTANEOUS at 08:28

## 2019-08-17 ASSESSMENT — PAIN SCALES - WONG BAKER: WONGBAKER_NUMERICALRESPONSE: 0

## 2019-08-18 LAB
ANION GAP SERPL CALCULATED.3IONS-SCNC: 11 MMOL/L (ref 3–16)
BUN BLDV-MCNC: 7 MG/DL (ref 7–20)
CALCIUM SERPL-MCNC: 8.9 MG/DL (ref 8.3–10.6)
CHLORIDE BLD-SCNC: 102 MMOL/L (ref 99–110)
CO2: 23 MMOL/L (ref 21–32)
CREAT SERPL-MCNC: 0.8 MG/DL (ref 0.6–1.2)
GFR AFRICAN AMERICAN: >60
GFR NON-AFRICAN AMERICAN: >60
GLUCOSE BLD-MCNC: 95 MG/DL (ref 70–99)
POTASSIUM REFLEX MAGNESIUM: 3.9 MMOL/L (ref 3.5–5.1)
SODIUM BLD-SCNC: 136 MMOL/L (ref 136–145)

## 2019-08-18 PROCEDURE — 6370000000 HC RX 637 (ALT 250 FOR IP): Performed by: INTERNAL MEDICINE

## 2019-08-18 PROCEDURE — 6360000002 HC RX W HCPCS: Performed by: INTERNAL MEDICINE

## 2019-08-18 PROCEDURE — 36415 COLL VENOUS BLD VENIPUNCTURE: CPT

## 2019-08-18 PROCEDURE — 2580000003 HC RX 258: Performed by: INTERNAL MEDICINE

## 2019-08-18 PROCEDURE — 80048 BASIC METABOLIC PNL TOTAL CA: CPT

## 2019-08-18 PROCEDURE — 97530 THERAPEUTIC ACTIVITIES: CPT

## 2019-08-18 PROCEDURE — 97161 PT EVAL LOW COMPLEX 20 MIN: CPT

## 2019-08-18 PROCEDURE — 1200000000 HC SEMI PRIVATE

## 2019-08-18 PROCEDURE — 97166 OT EVAL MOD COMPLEX 45 MIN: CPT

## 2019-08-18 PROCEDURE — 97116 GAIT TRAINING THERAPY: CPT

## 2019-08-18 RX ADMIN — SUCRALFATE 1 G: 1 SUSPENSION ORAL at 11:43

## 2019-08-18 RX ADMIN — ENOXAPARIN SODIUM 40 MG: 40 INJECTION SUBCUTANEOUS at 09:34

## 2019-08-18 RX ADMIN — LOTEPREDNOL ETABONATE 1 DROP: 5 SUSPENSION/ DROPS OPHTHALMIC at 09:34

## 2019-08-18 RX ADMIN — TIMOLOL MALEATE 1 DROP: 5 SOLUTION OPHTHALMIC at 21:18

## 2019-08-18 RX ADMIN — SUCRALFATE 1 G: 1 SUSPENSION ORAL at 16:53

## 2019-08-18 RX ADMIN — CARBOXYMETHYLCELLULOSE SODIUM 1 DROP: 10 GEL OPHTHALMIC at 21:19

## 2019-08-18 RX ADMIN — Medication 1 DROP: at 21:18

## 2019-08-18 RX ADMIN — PANTOPRAZOLE SODIUM 40 MG: 40 GRANULE, DELAYED RELEASE ORAL at 16:53

## 2019-08-18 RX ADMIN — ACYCLOVIR 400 MG: 200 CAPSULE ORAL at 21:19

## 2019-08-18 RX ADMIN — BACITRACIN ZINC AND POLYMYXIN B SULFATE: 500; 10000 OINTMENT OPHTHALMIC at 21:19

## 2019-08-18 RX ADMIN — CARBOXYMETHYLCELLULOSE SODIUM 1 DROP: 10 GEL OPHTHALMIC at 15:25

## 2019-08-18 RX ADMIN — SUCRALFATE 1 G: 1 SUSPENSION ORAL at 05:16

## 2019-08-18 RX ADMIN — Medication 10 ML: at 21:20

## 2019-08-18 RX ADMIN — ACYCLOVIR 400 MG: 200 CAPSULE ORAL at 13:44

## 2019-08-18 RX ADMIN — VITAMIN D, TAB 1000IU (100/BT) 2000 UNITS: 25 TAB at 09:33

## 2019-08-18 RX ADMIN — LOSARTAN POTASSIUM 100 MG: 100 TABLET, FILM COATED ORAL at 09:33

## 2019-08-18 RX ADMIN — AMLODIPINE BESYLATE 5 MG: 5 TABLET ORAL at 09:33

## 2019-08-18 RX ADMIN — Medication 1 DROP: at 09:41

## 2019-08-18 RX ADMIN — PANTOPRAZOLE SODIUM 40 MG: 40 GRANULE, DELAYED RELEASE ORAL at 09:43

## 2019-08-18 RX ADMIN — BACITRACIN ZINC AND POLYMYXIN B SULFATE: 500; 10000 OINTMENT OPHTHALMIC at 09:34

## 2019-08-18 RX ADMIN — CARBOXYMETHYLCELLULOSE SODIUM 1 DROP: 10 GEL OPHTHALMIC at 10:02

## 2019-08-18 RX ADMIN — ATORVASTATIN CALCIUM 10 MG: 10 TABLET, FILM COATED ORAL at 21:19

## 2019-08-18 RX ADMIN — CYANOCOBALAMIN TAB 500 MCG 500 MCG: 500 TAB at 09:33

## 2019-08-18 RX ADMIN — ESCITALOPRAM OXALATE 5 MG: 10 TABLET ORAL at 09:33

## 2019-08-18 RX ADMIN — TIMOLOL MALEATE 1 DROP: 5 SOLUTION OPHTHALMIC at 09:34

## 2019-08-18 RX ADMIN — ACYCLOVIR 400 MG: 200 CAPSULE ORAL at 05:16

## 2019-08-18 ASSESSMENT — PAIN SCALES - GENERAL: PAINLEVEL_OUTOF10: 0

## 2019-08-19 ENCOUNTER — APPOINTMENT (OUTPATIENT)
Dept: GENERAL RADIOLOGY | Age: 84
DRG: 392 | End: 2019-08-19
Payer: MEDICARE

## 2019-08-19 VITALS
OXYGEN SATURATION: 97 % | RESPIRATION RATE: 18 BRPM | TEMPERATURE: 97.8 F | WEIGHT: 180.2 LBS | SYSTOLIC BLOOD PRESSURE: 140 MMHG | HEIGHT: 58 IN | HEART RATE: 80 BPM | DIASTOLIC BLOOD PRESSURE: 72 MMHG | BODY MASS INDEX: 37.83 KG/M2

## 2019-08-19 PROCEDURE — 6360000002 HC RX W HCPCS: Performed by: INTERNAL MEDICINE

## 2019-08-19 PROCEDURE — 6370000000 HC RX 637 (ALT 250 FOR IP): Performed by: INTERNAL MEDICINE

## 2019-08-19 PROCEDURE — 74230 X-RAY XM SWLNG FUNCJ C+: CPT

## 2019-08-19 PROCEDURE — 92526 ORAL FUNCTION THERAPY: CPT

## 2019-08-19 PROCEDURE — 92611 MOTION FLUOROSCOPY/SWALLOW: CPT

## 2019-08-19 PROCEDURE — 99238 HOSP IP/OBS DSCHRG MGMT 30/<: CPT | Performed by: INTERNAL MEDICINE

## 2019-08-19 RX ADMIN — ESCITALOPRAM OXALATE 5 MG: 10 TABLET ORAL at 10:29

## 2019-08-19 RX ADMIN — BACITRACIN ZINC AND POLYMYXIN B SULFATE: 500; 10000 OINTMENT OPHTHALMIC at 10:30

## 2019-08-19 RX ADMIN — AMLODIPINE BESYLATE 5 MG: 5 TABLET ORAL at 10:29

## 2019-08-19 RX ADMIN — LOTEPREDNOL ETABONATE 1 DROP: 5 SUSPENSION/ DROPS OPHTHALMIC at 10:30

## 2019-08-19 RX ADMIN — LOSARTAN POTASSIUM 100 MG: 100 TABLET, FILM COATED ORAL at 10:29

## 2019-08-19 RX ADMIN — ACYCLOVIR 400 MG: 200 CAPSULE ORAL at 06:10

## 2019-08-19 RX ADMIN — VITAMIN D, TAB 1000IU (100/BT) 2000 UNITS: 25 TAB at 10:29

## 2019-08-19 RX ADMIN — ACYCLOVIR 400 MG: 200 CAPSULE ORAL at 14:04

## 2019-08-19 RX ADMIN — ACETAMINOPHEN 650 MG: 325 TABLET ORAL at 12:27

## 2019-08-19 RX ADMIN — SUCRALFATE 1 G: 1 SUSPENSION ORAL at 06:10

## 2019-08-19 RX ADMIN — TIMOLOL MALEATE 1 DROP: 5 SOLUTION OPHTHALMIC at 10:30

## 2019-08-19 RX ADMIN — ENOXAPARIN SODIUM 40 MG: 40 INJECTION SUBCUTANEOUS at 10:29

## 2019-08-19 RX ADMIN — CYANOCOBALAMIN TAB 500 MCG 500 MCG: 500 TAB at 10:29

## 2019-08-19 RX ADMIN — CARBOXYMETHYLCELLULOSE SODIUM 1 DROP: 10 GEL OPHTHALMIC at 14:05

## 2019-08-19 RX ADMIN — Medication 1 DROP: at 10:31

## 2019-08-19 RX ADMIN — CARBOXYMETHYLCELLULOSE SODIUM 1 DROP: 10 GEL OPHTHALMIC at 10:30

## 2019-08-19 RX ADMIN — PANTOPRAZOLE SODIUM 40 MG: 40 GRANULE, DELAYED RELEASE ORAL at 06:10

## 2019-08-19 ASSESSMENT — PAIN SCALES - GENERAL: PAINLEVEL_OUTOF10: 3

## 2019-08-19 NOTE — CARE COORDINATION
Discharge orders and Continuity of Care faxed to facility: Yes  Hospital Exemption Notification System complete: Yes  Transportation arranged: Yes - [address] @ 49 12 62. Patient / Family (pt/dtr) aware of  time: Yes   Nursing aware of  time: Yes  Receiving facility aware of  time: Yes, [facility] notified of  time  Pre-cert obtained?   No
Clinic: No     Pulmonary Rehab: No  Pain Clinic: No  [facility]: No    Wound Clinic: No     Other: NA    DISCHARGE PLAN: Chart reviewed. Met with pt and daughters  at bedside and explained the role of the CM. Pt sleeping. Daughters confirm that pt plans to return to AL [facility]). +CM following. Explained Case Management role/services.

## 2019-08-19 NOTE — PROGRESS NOTES
Consult has been called to Dr. Margarita Adams on 8/16/19. Spoke with luda.  12:02 PM    Ismael Barry  8/16/2019
Dr. Sunita Park paged at this time.  Mona Hernandez
EGD completed pt to recovery.
Handoff report and transfer of care given at bedside to [facility]. Patient in stable condition, denies needs/concerns at this time. Call light within reach.
Handoff report and transfer of care given at bedside to [facility] and [address] [place] [address]. Patient in stable condition, denies needs/concerns at this time. Call light within reach.
Morning assessment complete and medication given. POC reviewed with patient and family. Patient denies any additional needs at this time. Call light within reach. Will continue to monitor. Bed check on for patient safety.  Mona Hernandez
Ok to leave PIV out per Dr. Molly Lane. Per Dr. Molly Lane pt needs SLP eval (after EGD) before discharge. RN left message on SLP VM. No needs at this time. Will continue to monitor.
PROGRESS NOTE  S:85 yrs Patient  admitted on 8/15/2019 with Dysphagia [R13.10]  Dysphagia [R13.10] . S[eech recommend Dysphagia 2 diet and no straws with thin liquids    Exam:   Vitals:    08/19/19 1510   BP: (!) 140/72   Pulse: 80   Resp: 18   Temp: 97.8 °F (36.6 °C)   SpO2: 97%      General appearance: alert, appears stated age and cooperative  HEENT: PERRLA  Neck: no adenopathy, no carotid bruit, no JVD, supple, symmetrical, trachea midline and thyroid not enlarged, symmetric, no tenderness/mass/nodules  Lungs: clear to auscultation bilaterally  Heart: regular rate and rhythm, S1, S2 normal, no murmur, click, rub or gallop  Abdomen: soft, non-tender; bowel sounds normal; no masses,  no organomegaly  Extremities: extremities normal, atraumatic, no cyanosis or edema     Medications: Reviewed    Labs:  CBC: No results for input(s): WBC, HGB, HCT, MCV, PLT in the last 72 hours. BMP:   Recent Labs     08/18/19  0555      K 3.9      CO2 23   BUN 7   CREATININE 0.8     LIVER PROFILE: No results for input(s): AST, ALT, LIPASE, PROT, BILIDIR, BILITOT, ALKPHOS in the last 72 hours. Invalid input(s): AMYLASE,  ALB  PT/INR: No results for input(s): INR in the last 72 hours. Invalid input(s): PT    IMAGING:      Impression:  79 yo female with diarrhea and dysphagia to pills. EGD revealed esophagitis. Clinically improving. Deit as per speech    Recommendation:  1. Daily PPI  2. FU as outpatient as needed  3. Esophageal biopsies are benign  4. Will sign off.   Please call with questions      Bertin Barba DO  5:02 PM 8/19/2019
Patient is mostly blind. Can see 2 inches from face with right eye and can only tell motion with the left eye. Needs verbal que's for meal times and when ambulating with walker. Patient walks slow but tolerates activity.
Progress Note    Admit Date:  8/15/2019    80year-old admitted from assisted living for poor oral intake, increasing dysphagia, unable to swallow pills,  chronic diarrhea. She has been having the symptoms ongoing for several months, progressively getting worse   GI consulted. S/P EGD 8/16 - > esophagitis, with severe inflammation, large hiatal hernia . A lesion was noted at the distal esophagus and biopsied       Subjective:  Ms. Andry Fernandes is awake, alert and oriented now . Tolerating mechanical soft diet today   in no distress     sitting up in bed, working with therapy. feels better     Objective:   BP (!) 148/65   Pulse 80   Temp 98.6 °F (37 °C) (Oral)   Resp 16   Ht 4' 10\" (1.473 m)   Wt 180 lb 3.2 oz (81.7 kg)   SpO2 98%   BMI 37.66 kg/m²       Intake/Output Summary (Last 24 hours) at 8/18/2019 1226  Last data filed at 8/18/2019 0853  Gross per 24 hour   Intake 480 ml   Output --   Net 480 ml         Physical Exam:  General:  Awake, alert, NAD  Skin:  Warm and dry  Neck:  JVD absent. Neck supple  Chest:  Clear to auscultation, respiration easy. No wheezes, rales or rhonchi. Cardiovascular:  RRR ,S1S2 normal  Abdomen:  Soft, non tender, non distended, BS +  Extremities:  No edema. Intact peripheral pulses.  Brisk cap refill, < 2 secs  Neuro: non focal      Medications:   Scheduled Meds:   amLODIPine  5 mg Oral Daily    atorvastatin  10 mg Oral Daily    vitamin D  2,000 Units Oral Daily    escitalopram  5 mg Oral Daily    acyclovir  400 mg Oral Q8H    losartan  100 mg Oral Daily    vitamin B-12  500 mcg Oral Daily    pantoprazole sodium  40 mg Oral BID AC    loteprednol  1 drop Both Eyes Daily    carboxymethylcellulose PF  1 drop Both Eyes TID    timolol  1 drop Left Eye BID    bacitracin-polymyxin b   Both Eyes BID    sucralfate  1 g Oral 4 times per day    sodium chloride  1 drop Both Eyes BID    sodium chloride flush  10 mL Intravenous 2 times per day    enoxaparin  40 mg Subcutaneous
Pt resting as manifested by eyes closed in dark room. Respirations even and easy. Call light and bedside table in reach. Bed in low locked position. Denies any needs at this time.
Shift assessment complete; see flow sheet. Scheduled medications administered; See MAR. Pt denies pain  at this time. Pt denies any needs at this time. Call light within reach, Sitting in chair, chair alarm on.
Shift report written and given to radha. Mona Hernandez
Speech Language Pathology  Facility/Department: [facility] MEDICAL-SURGICAL  Dysphagia Daily Treatment Note    NAME: Monalisa Richard  : 1934  MRN: [id number]    Patient Diagnosis(es):   Patient Active Problem List    Diagnosis Date Noted    Diarrhea     Hyponatremia     Dysphagia 08/15/2019    Unsteady 10/02/2018    Urinary incontinence 10/02/2018    Fuchs' corneal dystrophy 2017    Gastroesophageal reflux disease with esophagitis 2016    Hyperlipidemia     Hypertension     Acute cholecystitis     Renal colic on left side 7094    Calculus of left ureter 2013     Allergies: Allergies   Allergen Reactions    Aspirin      Onset Date:   8-15-19  Subjective:  Alert in chair and her daughter arrived in the room. Pain:  None reported    Current Diet: DIET DYSPHAGIA MINCED AND MOIST; Dysphagia Minced and Moist    Diet Tolerance:  Patient tolerating current diet level without  Staff reported signs/symptoms of penetration / aspiration. P.O. Trials: see below  Thin       Nectar / Mildly Thick       Honey / Moderately Thick       Pudding / Extremely Thick       Puree       Solid         Dysphagia Treatment and Impressions: The patient was OOB in chair. She has MBS order. This visit was done to determine her candidacy for the exam and to explain all indications, contra-indications, alternative exams, and potential adverse outcomes. All exam rationales were explained as well, as steps in the consent to treat process. The patient was initially alone, appears confused, fixated on the prospect of leaving today. She reports that she is not sure if she would want to do the exam. SLP explained this all to the attending nurse. Then the patient's daughter arrived. SLP explained all MBS related information to the patient's daughter, along with all indications, contra-indications, alternative exams, rationales, and potential adverse outcomes.  The patient's daughter feels
Speech Language Pathology  SPEECH BRIEF NOTE: MBS completed, findings correlated with the Radiologist. Patient had heart rate and 02 sat WNL on room air. There was no aspiration on exam. There was laryngeal penetration with thin liquid large straw sips. She had some backflow noted in the upper 1/3 of the esophagus lateral view. GI work up is in progress. Suggest Dysphagia Level II and thin liquid as tolerated in small cup sips, no straw. She should have PO meds crushed in puree as tolerated. Hold PO intake for worsening respiratory status and/or if signs of aspiration develop. Findings d/w the patient, her daughter, the Radiologist, and the attending nurse. Full report to follow. Time [id number]. Aimee Parekh CCC-SLP. D BCS-S  8-19-19
Transport service here to take patient for SNF. Patient vocies no needs at discharge, family at bedside.
had another choking episode. She has had decreased oral intake of solids to the point where she is just drinking fluids for the most part and is skipping meals. She had initially told family that she was trying to lose weight because her clothes were fitting tighter. They have noticed that she is complaining of abdominal discomfort and seems to be more distended as well. \"    Per Dr. Saqib Medina 11-2-15 EGD: \"Esophagus: There was mild Schatzki ring at 31 cm from entry. There was also evidence of mild inflammation at the GE junction at 31 cm. Biopsies were taken. Dilation was performed using 15 mm, 16.5 mm and then 18 mm sequentially with adequate results. There was no evidence of Barretts esophagus. There was a large hiatal hernia from 31 to 37 cm from entry. \"        Per Dr. Saqib Medina 8-24-15 EGD: \"Esophagus: There were linear ulcerations in the lower esophagus arising from 25 to 31 cm from entry most likely reflux induced. 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 There was also evidence of single diverticulum above the stricture suggesting chronicity. The endoscope traversed through the stricture without any difficulty or resistance. Biopsies were taken of the stricture and the esophagitis. There was a large hiatal hernia from 31 to 37 cm from entry. \"    She was orally defensive to gag reflex testing. She has no odynophagia. She has dentures. She has fair oral ROM. Cough, saliva swallow, throat clear on cue were adequate. Patient had clear breath sounds throughout in area of larynx. She had no overt distress. She was repositioned upright. She had no oral asymmetry. She reports worsening mucous in the morning. She reports no cough and choke with meals. She reports choking with larger pills. She reports becoming full quickly. She reports there is a limit to what she can eat before she becomes full quickly.  She takes
there.    Goals : To be met in 3 visits:  1). Independent with LE Ex x 10 reps    To be met in 6 visits:  1). Supine to/from sit: Supervision  2). Sit to/from stand: Supervision (with or without cues for use of rollator). 3). Bed to chair: Supervision  4). Gait: Ambulate 150 ft.  with  SBA  and use of Rollator  5). Tolerate B LE exercises 3 sets of 10-15 reps  . Rehabilitation Potential: Good  Strengths for achieving goals include:   Pt motivated, Family Support and Pt cooperative  Barriers to achieving goals include: Other (possible cognitive impairment related to memory)    Plan    To be seen 3-5 x / week  while in acute care setting for therapeutic exercises, bed mobility, transfers, progressive gait training, balance training, and family/patient education. Esdras Lamb, PT  #[id number]      If patient discharges from this facility prior to next visit, this note will serve as the Discharge Summary.

## 2019-08-19 NOTE — PROCEDURES
INSTRUMENTAL SWALLOW REPORT  MODIFIED BARIUM SWALLOW    NAME: Leslye Moss   : 1934  MRN: [id number]       Date of Eval: 2019              Referring Diagnosis(es):      Past Medical History:  has a past medical history of Arthritis, Diverticulitis, Esophagitis, Glaucoma, Hyperlipidemia, Hypertension, Legally blind, and Urinary tract infection. Past Surgical History:  has a past surgical history that includes eye surgery; Cystoscopy (13); Cholecystectomy (13); Endoscopy, colon, diagnostic (8/24/15); Upper gastrointestinal endoscopy (2015); and Upper gastrointestinal endoscopy (N/A, 2019). Current Diet Solid Consistency: Dysphagia Minced and Moist (Dysphagia II)  Current Diet Liquid Consistency: Thin       Type of Study: Initial MBS         Patient Complaints/Reason for Referral:  Leslye Moss was referred for a MBS to assess the efficiency of his/her swallow function, assess for aspiration, and to make recommendations regarding safe dietary consistencies, effective compensatory strategies, and safe eating environment. General Comment  Comments: Medical chart checked for MBS  Subjective  Subjective: Alert in wheelchair, no family present in the room at the time of the visit. She had student nurse present who then left. Behavior/Cognition/Vision/Hearing:  Behavior/Cognition: Alert; Cooperative    Impressions:  Treatment Dx and ICD 10: dysphagia   Patient Position: Lateral;A-P and                                         Penetration-Aspiration Scale (PAS): 2 - Material enters the airway, remains above the vocal folds, and is ejected from the airway with thin liquid large straw sip only    Recommended Diet:  Solid consistency: Dysphagia Minced and Moist (Dysphagia II) as tolerated  Liquid consistency:  Thin liquid in small cup sips as tolerated, no straws  Liquid administration via: Cup    Medication administration: Meds in puree crushed as tolerated  Ongoing GI posterior 1/3 of the laryngopharynx during lateral view images. She was first seen in lateral view. She had nectar thick liquid 1x via tsp and 1x via cup. Fair oral anticipation, fair oral prep and transit. No oral pooling and no anterior oral loss. She has noted to have epiglottic rim resting on BOT at rest. She had fair BOT ROM. She had swallow trigger at the valleculae space. She had epiglottic ROM WNL for airway protection. No pharyngeal residual. Fair swallow strength and timeliness. There was adequate UES entry, with mild bolus backflow in the upper 1/3 of the esophagus lateral view. No penetration or aspiration. Same response is noted with 2 tsp of puree provided. There was some premature spillage with thin liquids to the valleculae prior to the swallow with single small cup sip. With larger thin liquid cup sip, there was more rapid spillage of thin liquid to the valleculae where swallow triggers. The valleculae quickly re-fills as additional oral bolus reaches the valleculae, where she immediately repeats her swallow and clears pharynx. The patient was given then 3 mech soft PO trials. There was fair mastication effort. She had bolus reach the valleculae space and 1x to the epiglottic rim where swallow triggered. Additional bolus from oral cavity reaches the valleculae a second time where she repeats the swallow and clears. Epiglottic ROM was adequate for airway protection. There was some mild backflow in the upper 1/3 of the esophagus in lateral view with thin liquids, not with mech soft. With thin liquid straw sips x2 there was more rapid bolus loss to the pharynx, reaching the lateral channels where the swallow triggers. There was small portion of laryngeal penetration during the swallow that cleared with the swallow. There was no aspiration. There was mild bolus backflow in the upper 1/3 of the esophagus that clears. There was adequate UES entry throughout.  Mild bolus coating intermittent on BOT during the exam that clears. No aspiration or penetration in A-P view with small portion of thin liquid cup sip. There was mild bolus backflow that clears in the upper esophagus. There was adequate pharyngeal peristalsis throughout. She had no overt distress. Explained findings to the attending nurse. Correlated findings the Radiologist. Explained findings to the patient and her daughter. SLP explained safe feeding precautions and aspiration precautions. SLP explained need for SLP dept follow up. SLP explained scope of practice in terms of esophageal dysphagia management. Sticky note left to MD. Explained the relationship between aspiration, pneumonia, airway compromise, and her risk factors. Pain   Patient Currently in Pain: No  Pain Level: 3  Pain Type: Acute pain  Pain Location: Abdomen           Therapy Time:   Individual Concurrent Group Co-treatment   Time In 0935         Time Out 1005         Minutes 30               THIS IS A LATE ENTRY, SHE WAS SEEN EARLIER TODAY    Arjun Webster CCC-SLP. D MIRIAM-S, 8/19/2019, 12:33 PM

## 2019-08-19 NOTE — DISCHARGE INSTR - COC
Continuity of Care Form    Patient Name: Chris Arambula   :  1934  MRN:  [id number]    Admit date:  8/15/2019  Discharge date:  19    Code Status Order: [facility]   Advance Directives:   [facility] Documentation     Date/Time Healthcare Directive Type of Healthcare Directive Copy in [address] Agent's Name Healthcare Agent's Phone Number    19 0935  --  Durable power of  for health care;Living will  --  --  --  --    19 1351  Yes, patient has an advance directive for healthcare treatment  --  Yes, copy in chart  --  --  --    08/15/19 214  Yes, patient has an advance directive for healthcare treatment  Durable power of  for health care  Yes, copy in chart  Healthcare power of    Debby Paula  Nicholas Huntnis  [phone] ; [phone]          Admitting Physician:  Yvonne Neff MD  PCP: HUGO Joseph CNP    Discharging Nurse: Aurora Blevins Real Unit/Room#: 3383/8911-46  Discharging Unit Phone Number: [phone]    Emergency Contact:   Extended Emergency Contact Information  Primary Emergency Contact: Jerson Muse   [address] Phone: [phone]  Relation: Child  Secondary Emergency Contact: Bernard Ospina   [address] Phone: [phone]  Relation: Child    Past Surgical History:  Past Surgical History:   Procedure Laterality Date    CHOLECYSTECTOMY  13    CYSTOSCOPY  13    with left ureteroscopy and stent placement    ENDOSCOPY, COLON, DIAGNOSTIC  8/24/15    Hietal hernia,Esophagitis, gastric ulcer    EYE SURGERY      UPPER GASTROINTESTINAL ENDOSCOPY  2015    esophagitis    UPPER GASTROINTESTINAL ENDOSCOPY N/A 2019    EGD BIOPSY performed by Aubrie Nick DO at [address] [place] [address]       Immunization History:   Immunization History   Administered Date(s) Administered    Influenza Vaccine, unspecified formulation

## 2019-08-20 ENCOUNTER — CARE COORDINATION (OUTPATIENT)
Dept: CASE MANAGEMENT | Age: 84
End: 2019-08-20

## 2019-09-24 NOTE — DISCHARGE SUMMARY
TYLENOL     amLODIPine 5 MG tablet  Commonly known as:  NORVASC  TAKE 1 TABLET BY MOUTH EVERY DAY     atorvastatin 10 MG tablet  Commonly known as:  LIPITOR  TAKE 1 TABLET BY MOUTH EVERY DAY     bacitracin 500 UNIT/GM ophthalmic ointment     CRANBERRY CONCENTRATE PO     DUREZOL 0.05 % Emul  Generic drug:  difluprednate     escitalopram 5 MG tablet  Commonly known as:  LEXAPRO  TAKE 1 TABLET BY MOUTH EVERY DAY     famciclovir 250 MG tablet  Commonly known as:  FAMVIR     losartan 100 MG tablet  Commonly known as:  COZAAR  TAKE 1 TABLET BY MOUTH EVERY DAY     LOTEMAX 0.5 % ophthalmic suspension  Generic drug:  loteprednol      5 % ophthalmic solution  Generic drug:  sodium chloride     REFRESH OP     timolol 0.5 % ophthalmic gel-forming  Commonly known as:  TIMOPTIC-XE     vitamin B-12 500 MCG tablet  Commonly known as:  CYANOCOBALAMIN     Vitamin D3 2000 units Caps              Discharge Condition/Location: stable/home     Follow Up:    PCP in 1 weeks      Time Spent on discharge is more than 28 minutes in the examination, evaluation, counseling and review of medications and discharge plan.       Laurita Nguyen MD 9/24/2019 8:43 AM

## 2022-01-24 ENCOUNTER — TELEPHONE (OUTPATIENT)
Dept: FAMILY MEDICINE CLINIC | Age: 87
End: 2022-01-24

## 2022-01-24 NOTE — TELEPHONE ENCOUNTER
[facility] is calling to see if you will sign death certificate, patient passed away at [facility]  [facility] AND [facility] on the 20th

## (undated) DEVICE — ENDO CARRY-ON PROCEDURE KIT INCLUDES SUCTION TUBING, LUBRICANT, GAUZE, BIOHAZARD STICKER, TRANSPORT PAD AND INTERCEPT BEDSIDE KIT.: Brand: ENDO CARRY-ON PROCEDURE KIT

## (undated) DEVICE — CONMED SCOPE SAVER BITE BLOCK, 20X27 MM: Brand: SCOPE SAVER

## (undated) DEVICE — FORCEP BX STD CAP 240CM RAD JAW 4